# Patient Record
Sex: FEMALE | Race: WHITE | Employment: FULL TIME | URBAN - METROPOLITAN AREA
[De-identification: names, ages, dates, MRNs, and addresses within clinical notes are randomized per-mention and may not be internally consistent; named-entity substitution may affect disease eponyms.]

---

## 2024-04-23 ENCOUNTER — OFFICE VISIT (OUTPATIENT)
Dept: INTERNAL MEDICINE CLINIC | Facility: CLINIC | Age: 29
End: 2024-04-23
Payer: COMMERCIAL

## 2024-04-23 VITALS
WEIGHT: 120 LBS | BODY MASS INDEX: 21.53 KG/M2 | RESPIRATION RATE: 20 BRPM | HEIGHT: 62.5 IN | SYSTOLIC BLOOD PRESSURE: 104 MMHG | TEMPERATURE: 98 F | HEART RATE: 78 BPM | OXYGEN SATURATION: 98 % | DIASTOLIC BLOOD PRESSURE: 60 MMHG

## 2024-04-23 DIAGNOSIS — Z13.29 SCREENING FOR ENDOCRINE, METABOLIC AND IMMUNITY DISORDER: ICD-10-CM

## 2024-04-23 DIAGNOSIS — F32.A ANXIETY AND DEPRESSION: ICD-10-CM

## 2024-04-23 DIAGNOSIS — F41.9 ANXIETY AND DEPRESSION: ICD-10-CM

## 2024-04-23 DIAGNOSIS — Z13.228 SCREENING FOR ENDOCRINE, METABOLIC AND IMMUNITY DISORDER: ICD-10-CM

## 2024-04-23 DIAGNOSIS — E61.1 IRON DEFICIENCY: ICD-10-CM

## 2024-04-23 DIAGNOSIS — E55.9 VITAMIN D DEFICIENCY: ICD-10-CM

## 2024-04-23 DIAGNOSIS — Z00.00 PHYSICAL EXAM, ANNUAL: Primary | ICD-10-CM

## 2024-04-23 DIAGNOSIS — Z13.29 SCREENING FOR THYROID DISORDER: ICD-10-CM

## 2024-04-23 DIAGNOSIS — Z13.1 SCREENING FOR DIABETES MELLITUS: ICD-10-CM

## 2024-04-23 DIAGNOSIS — Z11.3 SCREENING FOR STD (SEXUALLY TRANSMITTED DISEASE): ICD-10-CM

## 2024-04-23 DIAGNOSIS — Z13.220 SCREENING FOR LIPID DISORDERS: ICD-10-CM

## 2024-04-23 DIAGNOSIS — Z13.0 SCREENING FOR DEFICIENCY ANEMIA: ICD-10-CM

## 2024-04-23 DIAGNOSIS — Z13.0 SCREENING FOR ENDOCRINE, METABOLIC AND IMMUNITY DISORDER: ICD-10-CM

## 2024-04-23 PROCEDURE — 99385 PREV VISIT NEW AGE 18-39: CPT | Performed by: STUDENT IN AN ORGANIZED HEALTH CARE EDUCATION/TRAINING PROGRAM

## 2024-04-23 PROCEDURE — 3008F BODY MASS INDEX DOCD: CPT | Performed by: STUDENT IN AN ORGANIZED HEALTH CARE EDUCATION/TRAINING PROGRAM

## 2024-04-23 PROCEDURE — 3074F SYST BP LT 130 MM HG: CPT | Performed by: STUDENT IN AN ORGANIZED HEALTH CARE EDUCATION/TRAINING PROGRAM

## 2024-04-23 PROCEDURE — 3078F DIAST BP <80 MM HG: CPT | Performed by: STUDENT IN AN ORGANIZED HEALTH CARE EDUCATION/TRAINING PROGRAM

## 2024-04-23 NOTE — PROGRESS NOTES
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      [place]oved to the [place] about 4 [place] ago together with her boyfriend of 6 years. Patient's father [place] in [place] currently. Patient has h/o anxiety and depression and in the past, took Zoloft in the past for about 6 months, but discontinued that once she moved to the [place].    Diet: Regular  Exercise:  Physical work at [employer], sometimes fitness at home  Eye exam: Last time seen in May 2023, has glasses for computer, no contacts   Dentist: Will have an appt soon, plans to do every 6 months for cleanings and checkups   Driving: yes, with the seatbelt  Smoking: Not smoking  Alcohol:  Very rare  No other substance use.   Periods regular, last   7- 8 days, every 25 days, heavy on the first few days, no bleeding/spotting between periods.      Vaccinations:  Influenza: Out of season  COVID: Vaccinated, no recent boosters   Tdap/Td: done at 23 yo    Screenings:  Mammogram: not indicated  Colonoscopy screen? (age 45-75 or earlier based on risk): not indicated  PAP: October 2023 back in [facility], wants to wait on repeating for now.       Wt Readings from Last 6 Encounters:   04/23/24 120 lb (54.4 kg)     Body mass index is 21.6 kg/m².       No current outpatient medications on file.      History reviewed. No pertinent past medical history.   History reviewed. No pertinent surgical history.   Family History   Problem Relation Age of Onset    Other (liver cancer) Maternal Grandmother            REVIEW OF SYSTEMS:   GENERAL: feels well otherwise  SKIN: denies any unusual skin lesions  EYES:denies blurred vision or double vision  HEENT: denies nasal congestion, sinus pain or ST  LUNGS: denies shortness of breath with exertion  CARDIOVASCULAR: denies chest  pain on exertion  GI: denies abdominal pain, Positive for heartburn  : denies dysuria, vaginal discharge or itching,   MUSCULOSKELETAL: denies back pain  NEURO: denies headaches  PSYCHE: Positive depression or anxiety  HEMATOLOGIC: denies hx of anemia  ENDOCRINE: denies thyroid history  ALL/ASTHMA: denies hx of allergy or asthma    EXAM:   /60 (BP Location: Left arm, Patient Position: Sitting, Cuff Size: adult)   Pulse 78   Temp 98.4 °F (36.9 °C)   Resp 20   Ht 5' 2.5\" (1.588 m)   Wt 120 lb (54.4 kg)   LMP 04/16/2024   SpO2 98%   BMI 21.60 kg/m²   Body mass index is 21.6 kg/m².   GENERAL: well developed, well nourished,in no apparent distress  SKIN: no rashes,no suspicious lesions  HEENT: atraumatic, normocephalic,ears and throat are clear  EYES:PERRLA, conjunctiva are clear  NECK: supple,no adenopathy  LUNGS: clear to auscultation  CARDIO: nl s1 and s2, RRR without murmur  GI: good BS's,no masses, HSM or tenderness  BREAST: no dominant or suspicious mass, no nipple discharge  MUSCULOSKELETAL: Mild scoliosis, back is not tender,FROM of the back  EXTREMITIES: no cyanosis, clubbing or edema  NEURO: Oriented times three,cranial nerves are intact,motor and sensory are grossly intact    Labs:   No results found for: \"WBC\", \"HGB\", \"PLT\"   No results found for: \"GLU\", \"NA\", \"K\", \"CL\", \"CO2\", \"CREATSERUM\", \"CA\", \"ALB\", \"TP\", \"ALKPHO\", \"AST\", \"ALT\", \"BILT\", \"TSH\", \"T4F\"     No results found for: \"CHOLEST\", \"HDL\", \"TRIG\", \"LDL\", \"NONHDLC\"    No results found for: \"A1C\"   Vitamin D:    No results found for: \"VITD\"        ASSESSMENT AND PLAN:   Taz Rasmussen is a 28 year old female who presents for a complete physical exam.     1. Physical exam, annual  Breast exam performed in the office today. Per patient preference, will wait on PAP as she completed it in 2023. Will do PAP with next physical exam. Self breast exam explained. Pt' s weight is Body mass index is 21.6 kg/m². Recommended regular exercise.    Age appropriate screenings discussed and orders placed.  The patient indicates understanding of these issues and agrees to the plan.  Annual eye exam and Q 6 month dental exam recommended    2. Screening for endocrine, metabolic and immunity disorder  - Comp Metabolic Panel (14); Future    3. Screening for lipid disorders  - Lipid Panel; Future    4. Screening for diabetes mellitus  - Hemoglobin A1C; Future    5. Screening for deficiency anemia  - CBC With Differential With Platelet; Future  - Ferritin [E]; Future  - Iron And Tibc [E]; Future    6. Screening for STD (sexually transmitted disease)  - T Pallidum Screening [place]; Future  - HIV AG AB Combo (Consent Obtained prechecked); Future  - Urine Chlamydia/GC Amplification; Future  - Hepatitis B Surface Antibody; Future  - Hepatitis B Surface Antigen; Future  - HCV Antibody; Future    7. Screening for thyroid disorder  - TSH W Reflex To Free T4; Future    8. Vitamin D deficiency  - Vitamin D; Future    9. Iron deficiency  Had h/o low iron in the past   - Ferritin [E]; Future  - Iron And Tibc [E]; Future     10. Anxiety and depression.  [place] [facility].   A lot of feelings and worries about current situation back home and war going on. Was on Zoloft in the past, however stopped using it for the past 4 months and is doing good. Does not want to restart the medication at this time. No SIHI  Does not want referral for therapy at this time.     Return in about 1 year (around 4/23/2025) for physical exam or earlier depending on labs.    Desirae Recio MD

## 2024-04-24 ENCOUNTER — LAB ENCOUNTER (OUTPATIENT)
Dept: LAB | Age: 29
End: 2024-04-24
Attending: STUDENT IN AN ORGANIZED HEALTH CARE EDUCATION/TRAINING PROGRAM
Payer: COMMERCIAL

## 2024-04-24 DIAGNOSIS — Z13.0 SCREENING FOR DEFICIENCY ANEMIA: ICD-10-CM

## 2024-04-24 DIAGNOSIS — Z13.220 SCREENING FOR LIPID DISORDERS: ICD-10-CM

## 2024-04-24 DIAGNOSIS — E61.1 IRON DEFICIENCY: ICD-10-CM

## 2024-04-24 DIAGNOSIS — E55.9 VITAMIN D DEFICIENCY: ICD-10-CM

## 2024-04-24 DIAGNOSIS — Z11.3 SCREENING FOR STD (SEXUALLY TRANSMITTED DISEASE): ICD-10-CM

## 2024-04-24 DIAGNOSIS — Z13.1 SCREENING FOR DIABETES MELLITUS: ICD-10-CM

## 2024-04-24 DIAGNOSIS — Z13.29 SCREENING FOR ENDOCRINE, METABOLIC AND IMMUNITY DISORDER: ICD-10-CM

## 2024-04-24 DIAGNOSIS — Z13.0 SCREENING FOR ENDOCRINE, METABOLIC AND IMMUNITY DISORDER: ICD-10-CM

## 2024-04-24 DIAGNOSIS — Z13.29 SCREENING FOR THYROID DISORDER: ICD-10-CM

## 2024-04-24 DIAGNOSIS — Z13.228 SCREENING FOR ENDOCRINE, METABOLIC AND IMMUNITY DISORDER: ICD-10-CM

## 2024-04-24 LAB
ALBUMIN SERPL-MCNC: 4.2 G/DL (ref 3.4–5)
ALBUMIN/GLOB SERPL: 1.2 {RATIO} (ref 1–2)
ALP LIVER SERPL-CCNC: 47 U/L
ALT SERPL-CCNC: 29 U/L
ANION GAP SERPL CALC-SCNC: 5 MMOL/L (ref 0–18)
AST SERPL-CCNC: 14 U/L (ref 15–37)
BASOPHILS # BLD AUTO: 0.04 X10(3) UL (ref 0–0.2)
BASOPHILS NFR BLD AUTO: 0.8 %
BILIRUB SERPL-MCNC: 1 MG/DL (ref 0.1–2)
BUN BLD-MCNC: 15 MG/DL (ref 9–23)
CALCIUM BLD-MCNC: 9.6 MG/DL (ref 8.5–10.1)
CHLORIDE SERPL-SCNC: 106 MMOL/L (ref 98–112)
CHOLEST SERPL-MCNC: 199 MG/DL (ref ?–200)
CO2 SERPL-SCNC: 28 MMOL/L (ref 21–32)
CREAT BLD-MCNC: 0.91 MG/DL
DEPRECATED HBV CORE AB SER IA-ACNC: 21.5 NG/ML
EGFRCR SERPLBLD CKD-EPI 2021: 89 ML/MIN/1.73M2 (ref 60–?)
EOSINOPHIL # BLD AUTO: 0.23 X10(3) UL (ref 0–0.7)
EOSINOPHIL NFR BLD AUTO: 4.4 %
ERYTHROCYTE [DISTWIDTH] IN BLOOD BY AUTOMATED COUNT: 11.9 %
EST. AVERAGE GLUCOSE BLD GHB EST-MCNC: 97 MG/DL (ref 68–126)
FASTING PATIENT LIPID ANSWER: YES
FASTING STATUS PATIENT QL REPORTED: YES
GLOBULIN PLAS-MCNC: 3.4 G/DL (ref 2.8–4.4)
GLUCOSE BLD-MCNC: 87 MG/DL (ref 70–99)
HBA1C MFR BLD: 5 % (ref ?–5.7)
HBV SURFACE AB SER QL: NONREACTIVE
HBV SURFACE AB SERPL IA-ACNC: <3.1 MIU/ML
HBV SURFACE AG SER-ACNC: <0.1 [IU]/L
HBV SURFACE AG SERPL QL IA: NONREACTIVE
HCT VFR BLD AUTO: 42.5 %
HCV AB SERPL QL IA: NONREACTIVE
HDLC SERPL-MCNC: 71 MG/DL (ref 40–59)
HGB BLD-MCNC: 14.3 G/DL
IMM GRANULOCYTES # BLD AUTO: 0.01 X10(3) UL (ref 0–1)
IMM GRANULOCYTES NFR BLD: 0.2 %
IRON SATN MFR SERPL: 18 %
IRON SERPL-MCNC: 68 UG/DL
LDLC SERPL CALC-MCNC: 117 MG/DL (ref ?–100)
LYMPHOCYTES # BLD AUTO: 1.26 X10(3) UL (ref 1–4)
LYMPHOCYTES NFR BLD AUTO: 24.1 %
MCH RBC QN AUTO: 31.5 PG (ref 26–34)
MCHC RBC AUTO-ENTMCNC: 33.6 G/DL (ref 31–37)
MCV RBC AUTO: 93.6 FL
MONOCYTES # BLD AUTO: 0.57 X10(3) UL (ref 0.1–1)
MONOCYTES NFR BLD AUTO: 10.9 %
NEUTROPHILS # BLD AUTO: 3.12 X10 (3) UL (ref 1.5–7.7)
NEUTROPHILS # BLD AUTO: 3.12 X10(3) UL (ref 1.5–7.7)
NEUTROPHILS NFR BLD AUTO: 59.6 %
NONHDLC SERPL-MCNC: 128 MG/DL (ref ?–130)
OSMOLALITY SERPL CALC.SUM OF ELEC: 288 MOSM/KG (ref 275–295)
PLATELET # BLD AUTO: 276 10(3)UL (ref 150–450)
POTASSIUM SERPL-SCNC: 4.2 MMOL/L (ref 3.5–5.1)
PROT SERPL-MCNC: 7.6 G/DL (ref 6.4–8.2)
RBC # BLD AUTO: 4.54 X10(6)UL
SODIUM SERPL-SCNC: 139 MMOL/L (ref 136–145)
T PALLIDUM AB SER QL IA: NONREACTIVE
TIBC SERPL-MCNC: 373 UG/DL (ref 240–450)
TRANSFERRIN SERPL-MCNC: 250 MG/DL (ref 200–360)
TRIGL SERPL-MCNC: 61 MG/DL (ref 30–149)
TSI SER-ACNC: 1.93 MIU/ML (ref 0.36–3.74)
VIT D+METAB SERPL-MCNC: 32.2 NG/ML (ref 30–100)
VLDLC SERPL CALC-MCNC: 11 MG/DL (ref 0–30)
WBC # BLD AUTO: 5.2 X10(3) UL (ref 4–11)

## 2024-04-24 PROCEDURE — 83036 HEMOGLOBIN GLYCOSYLATED A1C: CPT | Performed by: STUDENT IN AN ORGANIZED HEALTH CARE EDUCATION/TRAINING PROGRAM

## 2024-04-24 PROCEDURE — 86706 HEP B SURFACE ANTIBODY: CPT | Performed by: STUDENT IN AN ORGANIZED HEALTH CARE EDUCATION/TRAINING PROGRAM

## 2024-04-24 PROCEDURE — 87591 N.GONORRHOEAE DNA AMP PROB: CPT | Performed by: STUDENT IN AN ORGANIZED HEALTH CARE EDUCATION/TRAINING PROGRAM

## 2024-04-24 PROCEDURE — 87340 HEPATITIS B SURFACE AG IA: CPT | Performed by: STUDENT IN AN ORGANIZED HEALTH CARE EDUCATION/TRAINING PROGRAM

## 2024-04-24 PROCEDURE — 82306 VITAMIN D 25 HYDROXY: CPT | Performed by: STUDENT IN AN ORGANIZED HEALTH CARE EDUCATION/TRAINING PROGRAM

## 2024-04-24 PROCEDURE — 80061 LIPID PANEL: CPT | Performed by: STUDENT IN AN ORGANIZED HEALTH CARE EDUCATION/TRAINING PROGRAM

## 2024-04-24 PROCEDURE — 86780 TREPONEMA PALLIDUM: CPT | Performed by: STUDENT IN AN ORGANIZED HEALTH CARE EDUCATION/TRAINING PROGRAM

## 2024-04-24 PROCEDURE — 80050 GENERAL HEALTH PANEL: CPT | Performed by: STUDENT IN AN ORGANIZED HEALTH CARE EDUCATION/TRAINING PROGRAM

## 2024-04-24 PROCEDURE — 87389 HIV-1 AG W/HIV-1&-2 AB AG IA: CPT | Performed by: STUDENT IN AN ORGANIZED HEALTH CARE EDUCATION/TRAINING PROGRAM

## 2024-04-24 PROCEDURE — 82728 ASSAY OF FERRITIN: CPT | Performed by: STUDENT IN AN ORGANIZED HEALTH CARE EDUCATION/TRAINING PROGRAM

## 2024-04-24 PROCEDURE — 87491 CHLMYD TRACH DNA AMP PROBE: CPT | Performed by: STUDENT IN AN ORGANIZED HEALTH CARE EDUCATION/TRAINING PROGRAM

## 2024-04-24 PROCEDURE — 83540 ASSAY OF IRON: CPT | Performed by: STUDENT IN AN ORGANIZED HEALTH CARE EDUCATION/TRAINING PROGRAM

## 2024-04-24 PROCEDURE — 86803 HEPATITIS C AB TEST: CPT | Performed by: STUDENT IN AN ORGANIZED HEALTH CARE EDUCATION/TRAINING PROGRAM

## 2024-04-24 PROCEDURE — 83550 IRON BINDING TEST: CPT | Performed by: STUDENT IN AN ORGANIZED HEALTH CARE EDUCATION/TRAINING PROGRAM

## 2024-04-25 LAB
C TRACH DNA SPEC QL NAA+PROBE: NEGATIVE
N GONORRHOEA DNA SPEC QL NAA+PROBE: NEGATIVE

## 2024-04-25 NOTE — PROGRESS NOTES
Results reviewed. Released to [facility].   Noah Mcghee,  I have reviewed your test results.     Your lipid panel showed slightly elevated LDL Cholesterol (\"bad cholesterol\").    The following are the dietary measures you can do to help decrease cholesterol levels:  · Cutting back on the amount of fat and cholesterol in your meals  · Eating less salt (sodium). This is especially important if you also have high blood pressure.  · Eating more fresh vegetables and fruits  · Eating lean proteins, such as fish, poultry, beans, and peas  · Eating less red meat and processed meats  · Using low-fat dairy products  · Using vegetable and nut oils in limited amounts  · Limiting how many sweets and processed foods, like chips, cookies, and baked goods, that you eat   · Limiting how many sugar-sweetened beverages you drink  · Limiting how often you eat out  · Limiting alcohol    Also increase your physical activity to at least 30 minutes a day  5 times a week of moderate intensity exercising.      HIV, Hepatitis C, Hepatitis B,  Syphilis, Chlamydia and gonorrhea testing was all negative, which is good news.  You are not immune to Hepatitis B. You can get vaccine in our office or in the pharmacy if you would like.    Your Vitamin D level is on the lower side of normal. You can start daily supplementation with Vitamin D 2000 Units for the next 3 months to bring the levels up. Going forward you should  take Vitamin D supplementation throughout Alis, Winter and Spring time.      You other test results, including Blood count, Liver, Kidney, Thyroid function, Iron levels, and electrolytes are all normal. HbA1C (screening for diabetes) is also normal.     Please let me know if you have any questions.  Desirae Recio MD

## 2025-01-22 NOTE — PROGRESS NOTES
Taz Rasmussen is a 29 year old female No obstetric history on file. Patient's last menstrual period was 01/12/2025.   Chief Complaint   Patient presents with    Physical    Other     WELL WOMAN EXAM   Ok with student   .  [place] 1 [place], s/o acne, weight gain, breast tenderness 2 weeks prior to menses - declines OCP  OBSTETRICS HISTORY:  OB History   Obstetric Comments   No hx pregnancy       GYNE HISTORY:  Periods regular monthly    History   Sexual Activity    Sexual activity: Not on file        Hx Prior Abnormal Pap: No  Pap Date: 09/01/23  Pap Result Notes: per pt normal, it was done in [facility]        MEDICAL HISTORY:  Past Medical History:    Anemia    Anxiety    Dysmenorrhea    Hormone disorder       SURGICAL HISTORY:  Past Surgical History:   Procedure Laterality Date    Breast surgery      Other surgical history  10/01/2019    breast fibroadenoma removal       SOCIAL HISTORY:  Social History     Socioeconomic History    Marital status: Single     Spouse name: Not on file    Number of children: Not on file    Years of education: Not on file    Highest education level: Not on file   Occupational History    Not on file   Tobacco Use    Smoking status: Never     Passive exposure: Never    Smokeless tobacco: Never   Vaping Use    Vaping status: Never Used   Substance and Sexual Activity    Alcohol use: Yes    Drug use: Never    Sexual activity: Not on file   Other Topics Concern    Not on file   Social History Narrative    Not on file     Social Drivers of Health     Financial Resource Strain: Not on file   Food Insecurity: Not on file   Transportation Needs: Not on file   Physical Activity: Not on file   Stress: Not on file   Social Connections: Not on file   Housing Stability: Not on file       FAMILY HISTORY:  Family History   Problem Relation Age of Onset    Other (liver cancer) Maternal Grandmother        MEDICATIONS:  No current outpatient medications on  file.    ALLERGIES:  Allergies[1]      Review of Systems:  Constitutional:  Denies fatigue, night sweats, hot flashes  Eyes:  denies blurred or double vision  Cardiovascular:  denies chest pain or palpitations  Respiratory:  denies shortness of breath  Gastrointestinal:  denies heartburn, abdominal pain, diarrhea or constipation  Genitourinary:  denies dysuria, incontinence, abnormal vaginal discharge, vaginal itching  Musculoskeletal:  denies back pain.  Skin/Breast:  Denies any breast pain, lumps, or discharge.   Neurological:  denies headaches, extremity weakness or numbness.  Psychiatric: denies depression or anxiety.  Endocrine:   denies excessive thirst or urination.  Heme/Lymph:  denies history of anemia, easy bruising or bleeding.      PHYSICAL EXAM:   Constitutional: well developed, well nourished  Head/Face: normocephalic  Neck/Thyroid: thyroid symmetric, no thyromegaly, no nodules, no adenopathy  Lymphatic:no abnormal supraclavicular or axillary adenopathy is noted  Breast: normal without palpable masses, tenderness, asymmetry, nipple discharge, nipple retraction or skin changes  Abdomen:  soft, nontender, nondistended, no masses  Skin/Hair: no unusual rashes or bruises  Extremities: no edema, no cyanosis  Psychiatric:  Oriented to time, place, person and situation. Appropriate mood and affect    Pelvic Exam:  External Genitalia: normal appearance, hair distribution, and no lesions  Urethral Meatus:  normal in size, location, without lesions and prolapse  Bladder:  No fullness, masses or tenderness  Vagina:  Normal appearance without lesions, no abnormal discharge  Cervix:  Normal without tenderness on motion  Uterus: normal in size, contour, position, mobility, without tenderness  Adnexa: normal without masses or tenderness  Perineum: normal  Anus: no hemorroids     Assessment & Plan:  Diagnoses and all orders for this visit:    Encounter for well woman exam with routine gynecological exam    Cervical  cancer screening  -     ThinPrep PAP with HPV Reflex Request B; Future                   [1] No Known Allergies

## 2025-02-06 NOTE — PROGRESS NOTES
OFFICE NOTE     Patient ID: Taz Rasmussen is a 29 year old female.  Today's Date: 02/06/25  Chief Complaint: Breast Problem    Patient comes into the office today complaining of right breast discomfort that started recently for the past 4 months, starts 1-2-week prior to her menses and then subsides after menstruation.  She describes it as a fullness sensation in her right breast more than the left and pain to palpation in the area, her nipples become very sensitive as well.  She did have a history of fibroadenoma surgery in 2019 in the right breast.  She states that back in [facility] they did an ultrasound of the right breast in 11/2023 and found there was a cyst about 0.9 cm in diameter.  She would like this to be evaluated.      Vitals:    02/06/25 1044   BP: 120/70   Pulse: 75   Resp: 18   Temp: 98.1 °F (36.7 °C)   SpO2: 99%   Weight: 125 lb (56.7 kg)   Height: 5' 2.5\" (1.588 m)     body mass index is 22.5 kg/m².  BP Readings from Last 3 Encounters:   02/06/25 120/70   01/22/25 120/78   04/23/24 104/60     The ASCVD Risk score (Elisabeth DK, et al., 2019) failed to calculate for the following reasons:    The 2019 ASCVD risk score is only valid for ages 40 to 79      Medications reviewed:  No current outpatient medications on file.         Assessment & Plan    1. Breast tenderness in female (Primary)  2. Breast cyst, right  3. Mass of lower inner quadrant of right breast  Patient has a small cystic mass in the upper inner quadrant of her right breast.  Slight discomfort to palpation in that area.  No nipple discharge and otherwise breast is normal.  Due to physical exam finding of breast cystic mass, I would recommend further evaluation with ultrasound.  Would also refer to breast specialist for consultation.    -     US BREAST BILATERAL COMPLETE (CPT=76641-50); Future; Expected date: 02/06/2025  -     Surg Onc Breast Referral - In Network      Follow Up: As needed/if symptoms worsen       There is a  longitudinal care relationship with me, the care plan reflects the ongoing nature of the continuous relationship of care, and the medical record indicates that there is ongoing treatment of a serious/complex medical condition which I am currently managing.  is Applicable.     Objective/ Results:   Physical Exam  Vitals reviewed.   Constitutional:       General: She is not in acute distress.     Appearance: Normal appearance. She is not ill-appearing.   HENT:      Head: Normocephalic and atraumatic.   Eyes:      Extraocular Movements: Extraocular movements intact.      Conjunctiva/sclera: Conjunctivae normal.      Pupils: Pupils are equal, round, and reactive to light.   Cardiovascular:      Rate and Rhythm: Normal rate and regular rhythm.      Heart sounds: No murmur heard.     No gallop.   Pulmonary:      Effort: Pulmonary effort is normal. No respiratory distress.      Breath sounds: Normal breath sounds. No stridor. No wheezing, rhonchi or rales.   Chest:      Chest wall: No mass, lacerations or deformity.   Breasts:     Right: Mass and tenderness present. No swelling, bleeding, inverted nipple, nipple discharge or skin change.      Left: No swelling, bleeding, inverted nipple, mass, nipple discharge, skin change or tenderness.       Musculoskeletal:      Right lower leg: No edema.      Left lower leg: No edema.   Lymphadenopathy:      Cervical: No cervical adenopathy.      Upper Body:      Right upper body: No supraclavicular, axillary or pectoral adenopathy.      Left upper body: No supraclavicular, axillary or pectoral adenopathy.   Skin:     General: Skin is warm.      Capillary Refill: Capillary refill takes less than 2 seconds.   Neurological:      General: No focal deficit present.      Mental Status: She is alert and oriented to person, place, and time.   Psychiatric:         Mood and Affect: Mood normal.         Behavior: Behavior normal.         Thought Content: Thought content normal.          Judgment: Judgment normal.        Reviewed:    There are no active problems to display for this patient.     Allergies[1]     Social History     Socioeconomic History    Marital status: Single   Tobacco Use    Smoking status: Never     Passive exposure: Never    Smokeless tobacco: Never   Vaping Use    Vaping status: Never Used   Substance and Sexual Activity    Alcohol use: Yes    Drug use: Never      Review of Systems   Constitutional: Negative.    HENT: Negative.     Eyes: Negative.    Respiratory: Negative.     Cardiovascular: Negative.    Gastrointestinal: Negative.    Endocrine: Negative.         Breast pain in the right breast   Genitourinary: Negative.    Musculoskeletal: Negative.    Skin: Negative.    Neurological: Negative.      All other systems negative unless otherwise stated in ROS or HPI above.       Desirae Recio MD  Internal Medicine       Call office with any questions or seek emergency care if necessary.   Patient understands and agrees to follow directions and advice.      ----------------------------------------- PATIENT INSTRUCTIONS-----------------------------------------     There are no Patient Instructions on file for this visit.        The 21st Century Cures Act makes medical notes available to patients in the interest of transparency.  However, please be advised that this is a medical document.  It is intended as a peer to peer communication.  It is written in medical language and may contain abbreviations or verbiage that are technical and unfamiliar.  It may appear blunt or direct.  Medical documents are intended to carry relevant information, facts as evident, and the clinical opinion of the practitioner.          [1] No Known Allergies

## 2025-02-19 NOTE — TELEPHONE ENCOUNTER
Incoming (mail or fax):  fax  Received from:  [facility]  Documentation given to:  Triage results    Right and left breast ultrasound results

## 2025-02-19 NOTE — TELEPHONE ENCOUNTER
Tried calling patient to discuss the results.   Did not answer. I left voice mail to call back to clinic.    I will provide a referral to Beast specialist - Dr. Sandoval for further evaluation of the right breast mass.   Please send patient My Chart message with the information about the referral. Please let patient know to bring the results of her US to that appointment ( should be printed out)

## 2025-02-19 NOTE — TELEPHONE ENCOUNTER
Patient notified. Patient verbalized understanding   [facility]

## 2025-04-01 NOTE — PATIENT INSTRUCTIONS
Ultrasound ordered for October 2025  Return to clinic after this is completed      Breast pain is very common. A survey of women found that almost half had mild breast pain, and about 1/5 had severe breast pain, although most had not reported these symptoms to their doctor. Breast pain is the most common breast-related symptoms for which patients seek medical treatment, and accounts for about half of the breast-related office visits. Luckily, rarely is breast pain due to cancer.    Most commonly, breast pain is cyclical, meaning that it is related to normal hormonal fluctuations of the menstrual cycle. Usually it is felt the week prior to the start of menses, and resolves after the end of menses.  Hormonal breast pain tends to be in both breasts and most severe in the upper and outer aspects of the breast. Minor cyclical breast discomfort is very normal. However, in a small number of women, this cyclical pain can be moderate to severe, affecting day-to-day activities.    Noncyclical breast pain does not follow the usual menstrual pattern.  It tends to be on one side and in different locations of the breast.  There are many causes of noncyclical/non-hormonal breast pain:     Large breasts: large breasts tend to pull on the ligaments of the breast, and discomfort may involve the neck, back, and shoulder as well.   Diet: many patients report that cutting back on caffeine has greatly improved their breast pain.  Also, a low-fat, high complex carbohydrate diet has been shown to be helpful in some small studies.   Smoking: smoking might increase breast pain by increasing epinephrine levels in the breast. Epinephrine is involved in the perception of pain.   Medication: up to one third of women taking postmenopausal hormone therapy may experience some degree of noncyclical breast pain.  Luckily, this tends to resolve over time.  Other medications can also contribute to pain, including some antidepressants, cardiovascular  agents, and antibiotics.   Chest wall pain: This is most commonly from irritation of the pectoralis major muscle.  Examples of activities that can cause this muscle pain include waterskiing, raking, rowing, and shoveling.   Pregnancy: it is important to consider pregnancy as a common cause of breast pain.    Treatments   Wear a supportive bra as much as possible.   Apply heat to the breast with a heating pad.   Take over-the-counter pain relievers as needed (Acetaminophen/Tylenol and/or Ibuprofen/Advil).   Avoid caffeine. Well designed studies have not shown that avoiding caffeine can treat breast pain.  However, many women report significant improvement in their symptoms when they reduce their intake of tea, coffee, chocolate, and energy drinks.   Try Evening Primrose Oil (1,000mg to 1,500mg per day). Studies have not consistently shown that evening primrose oil is helpful, but some women have reported relief. Evening Primrose Oil is found over-the-counter. It may cause side effects such as nausea, diarrhea, and headaches.   Try Vitamin E (400 International unit per day). Studies have not consistently shown benefits of Vitamin E for treating breast pain, though some women find it helpful. Using Vitamin E for a few weeks is unlikely to cause any harm, but it should not be used for the long-term.   Try Omega-3 fatty acid.  Though not proven to be effect in rigorous studies, some women find increased intake of fish oils/omega-3 supplements to be helpful. Natural dietary sources include: dark green leafy vegetables, wild-caught cold-water fish, flax, walnuts, and sesame. Omega-3 supplements are also available by prescription and over-the-counter.   Give it time. Most commonly, pain goes away on its own after a few months, without the need for any treatment.

## (undated) NOTE — Clinical Note
Thank you for allowing me to care for your patient! We discussed her breast pain and fibroadenomas. No surgery at this point, will order an ultrasound to follow up later this year. I will see her around December. Thanks, Barak